# Patient Record
Sex: MALE | Race: WHITE | Employment: OTHER | URBAN - METROPOLITAN AREA
[De-identification: names, ages, dates, MRNs, and addresses within clinical notes are randomized per-mention and may not be internally consistent; named-entity substitution may affect disease eponyms.]

---

## 2020-12-30 ENCOUNTER — HOSPITAL ENCOUNTER (EMERGENCY)
Age: 25
Discharge: HOME | End: 2020-12-30
Attending: EMERGENCY MEDICINE
Payer: OTHER GOVERNMENT

## 2020-12-30 ENCOUNTER — APPOINTMENT (OUTPATIENT)
Dept: GENERAL RADIOLOGY | Age: 25
End: 2020-12-30
Payer: OTHER GOVERNMENT

## 2020-12-30 VITALS
RESPIRATION RATE: 18 BRPM | HEIGHT: 73 IN | OXYGEN SATURATION: 99 % | SYSTOLIC BLOOD PRESSURE: 130 MMHG | TEMPERATURE: 96.2 F | DIASTOLIC BLOOD PRESSURE: 82 MMHG | WEIGHT: 160 LBS | HEART RATE: 96 BPM | BODY MASS INDEX: 21.2 KG/M2

## 2020-12-30 DIAGNOSIS — M25.461 KNEE EFFUSION, RIGHT: Primary | ICD-10-CM

## 2020-12-30 PROCEDURE — 99284 EMERGENCY DEPT VISIT MOD MDM: CPT

## 2020-12-30 PROCEDURE — 87075 CULTR BACTERIA EXCEPT BLOOD: CPT

## 2020-12-30 PROCEDURE — 2500000003 HC RX 250 WO HCPCS: Performed by: EMERGENCY MEDICINE

## 2020-12-30 PROCEDURE — 89060 EXAM SYNOVIAL FLUID CRYSTALS: CPT

## 2020-12-30 PROCEDURE — 87205 SMEAR GRAM STAIN: CPT

## 2020-12-30 PROCEDURE — 87070 CULTURE OTHR SPECIMN AEROBIC: CPT

## 2020-12-30 PROCEDURE — 89051 BODY FLUID CELL COUNT: CPT

## 2020-12-30 PROCEDURE — 73562 X-RAY EXAM OF KNEE 3: CPT

## 2020-12-30 RX ORDER — LIDOCAINE HYDROCHLORIDE 10 MG/ML
20 INJECTION, SOLUTION INFILTRATION; PERINEURAL ONCE
Status: COMPLETED | OUTPATIENT
Start: 2020-12-30 | End: 2020-12-30

## 2020-12-30 RX ADMIN — LIDOCAINE HYDROCHLORIDE 20 ML: 10 INJECTION, SOLUTION INFILTRATION; PERINEURAL at 07:23

## 2020-12-30 ASSESSMENT — PAIN SCALES - GENERAL: PAINLEVEL_OUTOF10: 7

## 2020-12-30 ASSESSMENT — PAIN DESCRIPTION - PAIN TYPE: TYPE: ACUTE PAIN

## 2020-12-30 ASSESSMENT — PAIN DESCRIPTION - LOCATION: LOCATION: KNEE

## 2020-12-30 NOTE — ED PROVIDER NOTES
[address]      Pt Name: Xu Connelly  MRN: [id number]  [place] 1995  Date of evaluation: 12/30/2020      CHIEF COMPLAINT       Chief Complaint   Patient presents with    Knee Pain     knee pain and swelling x one week. pt denies injury         HISTORY OF PRESENT ILLNESS      The patient presents with swelling and pain in his right knee for the past week. He does not recall any trauma. He says one morning he woke up and his knee was swollen. It is worse with movement. It is not red or warm. He has not had a true fever. He denies numbness or tingling. Pain is worse with movement and better with rest.      REVIEW OF SYSTEMS       All systems reviewed and negative unless noted in HPI. The patient denies fever but had some chills today. Denies vision change. Denies any sore throat or rhinorrhea. Denies any neck pain or stiffness. Denies chest pain or shortness of breath. Right knee swelling and pain as noted in HPI. Denies any weakness, numbness or focal neurologic deficit. Denies any skin rash or edema. No recent psychiatric issues. No easy bruising or bleeding. Denies any polyuria, polydypsia or history of immunocompromise. PAST MEDICAL HISTORY    has a past medical history of Depression. SURGICAL HISTORY      has no past surgical history on file. CURRENT MEDICATIONS       Previous Medications    No medications on file       ALLERGIES     has No Known Allergies. FAMILY HISTORY     has no family status information on file. family history is not on file. SOCIAL HISTORY      reports that he has never smoked. He has never used smokeless tobacco. He reports current alcohol use of about 4.0 standard drinks of alcohol per week. He reports that he does not use drugs. PHYSICAL EXAM     INITIAL VITALS:  height is 6' 1\" (1.854 m) and weight is 72.6 kg (160 lb). His oral temperature is 96.2 °F (35.7 °C).  His blood pressure is 130/82 and his pulse is 96. His respiration is 18 and oxygen saturation is 99%. The patient is alert and oriented, in no apparent distress. HEENT is atraumatic. Pupils are PERRL at 4 mm. Mucous membranes moist.    Neck is supple. Heart sounds regular rate and rhythm. Lungs clear, no wheezes, rales or rhonchi. Abdomen: soft, nontender with no pain to palpation. Musculoskeletal exam:  Right lower extremity demonstrates moderate joint effusion in knee. No redness or warmth. Pain with flexion of knee. Normal distal pulses. No pain in thigh or calf. The remainder of the musculoskeletal system is unremarkable. Skin: no rash or edema. Neurological exam reveals cranial nerves 2 through 12 grossly intact. Patient has equal  and normal deep tendon reflexes. Psychiatric: appropriate. Lymphatics.:  No lymphadenopathy. DIFFERENTIAL DIAGNOSIS/ MDM:     Joint effusion    DIAGNOSTIC RESULTS       RADIOLOGY:   I reviewed the radiologist interpretations:  XR KNEE RIGHT (3 VIEWS)   Preliminary Result   1. No evidence of acute osseous abnormality. 2. Findings suggestive of presence of minimal effusion in the region the   suprapatellar bursa as described above. EMERGENCY DEPARTMENT COURSE:   Vitals:    Vitals:    12/30/20 0704   BP: 130/82   Pulse: 96   Resp: 18   Temp: 96.2 °F (35.7 °C)   TempSrc: Oral   SpO2: 99%   Weight: 72.6 kg (160 lb)   Height: 6' 1\" (1.854 m)     -------------------------  BP: 130/82, Temp: 96.2 °F (35.7 °C), Pulse: 96, Resp: 18      Re-evaluation Notes    The patient would like to continue Advil. He declines a knee immobilizer but we have given him an Ace wrap. The patient is asked to follow-up with orthopedist.  He is discharged in good condition. PROCEDURES:    Joint aspiration: The patient's knee was prepped with Betadine. I injected a small amount of lidocaine without epinephrine into the skin. 0.5 mL was used.   A an 18-gauge

## 2020-12-31 ENCOUNTER — OFFICE VISIT (OUTPATIENT)
Dept: ORTHOPEDIC SURGERY | Age: 25
End: 2020-12-31
Payer: OTHER GOVERNMENT

## 2020-12-31 VITALS — HEIGHT: 73 IN | WEIGHT: 160 LBS | BODY MASS INDEX: 21.2 KG/M2

## 2020-12-31 LAB
APPEARANCE FLUID: NORMAL
BASO FLUID: NORMAL %
COLOR FLUID: NORMAL
CRYSTALS, FLUID: NEGATIVE
EOSINOPHIL FLUID: NORMAL %
FLUID DIFF COMMENT: NORMAL
LYMPHOCYTES, BODY FLUID: 21 %
MONOCYTE, FLUID: NORMAL %
NEUTROPHIL, FLUID: 69 %
OTHER CELLS FLUID: NORMAL %
RBC FLUID: NORMAL /MM3
SPECIMEN TYPE: NORMAL
SPECIMEN TYPE: NORMAL
WBC FLUID: NORMAL /MM3

## 2020-12-31 PROCEDURE — 20610 DRAIN/INJ JOINT/BURSA W/O US: CPT | Performed by: PHYSICIAN ASSISTANT

## 2020-12-31 PROCEDURE — 99203 OFFICE O/P NEW LOW 30 MIN: CPT | Performed by: PHYSICIAN ASSISTANT

## 2020-12-31 RX ORDER — METHYLPREDNISOLONE 4 MG/1
TABLET ORAL
Qty: 1 KIT | Refills: 0 | Status: SHIPPED | OUTPATIENT
Start: 2020-12-31 | End: 2021-01-06

## 2020-12-31 NOTE — PROGRESS NOTES
[facility]  [address] [address] [facility] [address]  Dept: [phone]    Ambulatory Orthopedic New Patient Visit      CHIEF COMPLAINT:    Chief Complaint   Patient presents with    New Patient     Right knee        HISTORY OF PRESENT ILLNESS:      Symptom Onset: 12/24/2020  Emergency Room: 12/30/2020    The patient is a 30 y.o. male who is being seen  for consultation and evaluation of left knee effusion. Pedro Luis Feliciano  presents for right knee pain that has been present for  8 days. The patient does not recall a specific injury. He states that approximately 8 days ago he woke up with right knee pain and a right knee effusion followed. Patient states that he works as a  and has been working long/late hours recently. The patient denies previous injury, trauma, surgery to the right knee. Patient states that when he noticed the increase in swelling and pain he decided to not be evaluated at [facility] emergency department on 12/30/2020. Patient had his right knee aspirated 20 cc of blood-tinged synovial fluid that was sent for culture, cell count and crystal analysis. Right knee aspiration results are not all complete at this time we are waiting for the crystal analysis. The patient does not have a personal history of gout and is unsure about family history of gout. Cell count:   WBC -13,878  RBC -287, 000  Neutrophil 69%  Lymphocyte 21%    Aspiration culture:  No neutrophils appreciated on direct exam  No bacteria   No growth x19 hours    Bloody fluid crystal analysis pending. Patient states that when he noticed the increase in the effusion is when he noticed an increase in pain. The patient admits to taking Aleve which has dramatically helped his right knee pain. Patient lives in the [facility] area and was evaluated yesterday in the emergency department at [facility] ED.  he drove down this morning for his follow-up appointment and is interested in continuing his care in his local area. The patient has not had a previous corticosteroid injection. The patient has not had previous physical therapy for this problem. The patient has tried oral NSAIDs for this problem and notes that Aleve improves his discomfort. Past Medical History:    Past Medical History:   Diagnosis Date    Depression        Past Surgical History:    No past surgical history on file. Current Medications:   Current Outpatient Medications   Medication Sig Dispense Refill    methylPREDNISolone (MEDROL DOSEPACK) 4 MG tablet Take by mouth. 1 kit 0     No current facility-administered medications for this visit. Allergies:    Patient has no known allergies. Social History:   Social History     Socioeconomic History    Marital status: Single     Spouse name: Not on file    Number of children: Not on file    Years of education: Not on file    Highest education level: Not on file   Occupational History    Not on file   Social Needs    Financial resource strain: Not on file    Food insecurity     Worry: Not on file     Inability: Not on file    Transportation needs     Medical: Not on file     Non-medical: Not on file   Tobacco Use    Smoking status: Never Smoker    Smokeless tobacco: Never Used   Substance and Sexual Activity    Alcohol use:  Yes     Alcohol/week: 4.0 standard drinks     Types: 4 Cans of beer per week    Drug use: Never    Sexual activity: Not on file   Lifestyle    Physical activity     Days per week: Not on file     Minutes per session: Not on file    Stress: Not on file   Relationships    Social connections     Talks on phone: Not on file     Gets together: Not on file     Attends Mu-ism service: Not on file     Active member of club or organization: Not on file     Attends meetings of clubs or organizations: Not on file     Relationship status: Not on file    Intimate partner violence     Fear of current or ex partner: Not on file     Emotionally abused: Not on file     Physically abused: Not on file     Forced sexual activity: Not on file   Other Topics Concern    Not on file   Social History Narrative    Not on file       Family History:  No family history on file. REVIEW OF SYSTEMS:  Review of Systems   Constitutional: Positive for activity change (Due to right knee swelling). Negative for fever. HENT: Negative for sneezing. Respiratory: Negative for cough and shortness of breath. Cardiovascular: Negative for chest pain. Gastrointestinal: Negative for vomiting. Musculoskeletal: Positive for arthralgias (Right knee) and joint swelling (Right knee). Negative for myalgias. Skin: Negative for color change. Neurological: Negative for weakness and numbness. Psychiatric/Behavioral: Negative for sleep disturbance. PHYSICAL EXAM:  Ht 6' 1\" (1.854 m)   Wt 160 lb (72.6 kg)   BMI 21.11 kg/m²  Body mass index is 21.11 kg/m². Physical Exam  Gen: alert and oriented  Psych:  Appropriate affect; Appropriate knowledge base; Appropriate mood; No hallucinations; Head: normocephalic, atraumatic   Chest: symmetric chest excursion  Pelvis: stable with ambulation  Ortho Exam  Extremity: Patient ambulates independently with a moderate limp appreciated to the right lower extremity. Patient has a previous aspiration site appreciated along the medial joint line of the right knee. Evaluation of the right knee reveals a severe right knee effusion. There is no erythema, skin warmth, drainage or signs of infection present noted to the right knee. AROM right knee prior to aspiration: 10-45, AROM right knee after aspiration 5-90 with discomfort appreciated in flexion. Tenderness palpation along the patellar tendon and medial joint line. Patient is guarded with passive range of motion of the right knee.   Sensation is intact to light touch to the right lower extremity without focal deficits present. The patient has full range of motion of the right hip and ankle without discomfort appreciated. The skin is noted to be pink and warm with brisk capillary refill on the right lower extremity. Radiology:  Xr Knee Right (3 Views)    Result Date: 12/30/2020  EXAMINATION: THREE X-RAY VIEWS OF THE RIGHT KNEE 12/30/2020 7:13 am COMPARISON: None HISTORY: ORDERING SYSTEM PROVIDED HISTORY: Effusion TECHNOLOGIST PROVIDED HISTORY: Effusion Reason for Exam: Right ant knee pain Acuity: Acute Type of Exam: Initial Additional signs and symptoms: Swelling Relevant Medical/Surgical History: Pt denies FINDINGS: There is suggestion of presence of minimal fluid in the region of the suprapatellar bursa. No other significant bone or joint abnormality is identified. No evidence of acute fracture or dislocation. 1. No evidence of acute osseous abnormality. 2. Findings suggestive of presence of minimal effusion in the region of the suprapatellar bursa as described above. Procedure:  After informed consent was obtained verbally, using a superior lateral approach to the  Right  knee, the skin was locally prepped with Betadine and locally anesthetized with ethyl chloride spray. The knee was then aspirated of  55 mL of blood-tinged synovial fluid   Sterile dressing was applied. Patient tolerated the procedure well without postinjection complications. ASSESSMENT:     1. Right knee pain, unspecified chronicity    2. Knee hemarthrosis, right         PLAN:     Today in office we discussed etiology and natural history of right knee hemarthrosis without injury/trauma. I personally reviewed the patient's x-rays from 12/30/2020 with him in office. We discussed that if he lived in the area we would order an MRI of the right knee to further evaluate for ligamentous or meniscal pathology.     I discussed with the patient that his hemarthrosis is likely due to some sort of soft tissue injury whether it is meniscal or ligamentous is uncertain at this time. It is difficult to distinguish why the right knee effusion began. His current right knee aspiration results from yesterday in the emergency department do not show an active infection but the crystal analysis is pending. The patient is unsure of family history of gout. The patient was given a Medrol Dosepak prescription for his acute right knee pain. He was also given a right knee economy brace for comfort and support. He was instructed to continue ice, elevation and activity as tolerated to the right lower extremity. He would like to proceed with treatment at a more local orthopedic office due to the fact that he lives in the [facility]. He states that he will contact his primary care physician and request a referral to an orthopedic office. I discussed with him that I will call him with the remaining crystal analysis results from the right knee aspiration. At this point the patient will follow up as needed in our office for his right knee effusion. He was instructed to call our office with any questions or concerns. He voiced his understanding. Return if symptoms worsen or fail to improve. Orders Placed This Encounter   Medications    methylPREDNISolone (MEDROL DOSEPACK) 4 MG tablet     Sig: Take by mouth. Dispense:  1 kit     Refill:  0       Orders Placed This Encounter   Procedures    XR KNEE RIGHT (MIN 4 VIEWS)     Standing Status:   Future     Number of Occurrences:   1     Standing Expiration Date:   12/31/2021       This note is created with the assistance of a speech recognition program.  While intending to generate a document that actually reflects the content of the visit, the document can still have some errors including those of syntax and sound a like substitutions which may escape proof reading. In such instances, actual meaning can be extrapolated by contextual diversion.      Electronically signed by Fermin Aguilera KT Rm 12/31/2020 at 10:27 AM

## 2021-01-05 LAB
CULTURE: NORMAL
DIRECT EXAM: NORMAL
DIRECT EXAM: NORMAL
Lab: NORMAL
SPECIMEN DESCRIPTION: NORMAL